# Patient Record
Sex: FEMALE | Race: BLACK OR AFRICAN AMERICAN | Employment: FULL TIME | URBAN - METROPOLITAN AREA
[De-identification: names, ages, dates, MRNs, and addresses within clinical notes are randomized per-mention and may not be internally consistent; named-entity substitution may affect disease eponyms.]

---

## 2018-04-06 ENCOUNTER — HOSPITAL ENCOUNTER (EMERGENCY)
Age: 38
Discharge: HOME | End: 2018-04-06
Attending: EMERGENCY MEDICINE
Payer: OTHER GOVERNMENT

## 2018-04-06 VITALS
DIASTOLIC BLOOD PRESSURE: 55 MMHG | HEIGHT: 63 IN | TEMPERATURE: 98.2 F | SYSTOLIC BLOOD PRESSURE: 126 MMHG | WEIGHT: 180 LBS | HEART RATE: 102 BPM | OXYGEN SATURATION: 99 % | RESPIRATION RATE: 23 BRPM | BODY MASS INDEX: 31.89 KG/M2

## 2018-04-06 DIAGNOSIS — L50.9 URTICARIA: Primary | ICD-10-CM

## 2018-04-06 LAB
ANION GAP BLD CALC-SCNC: 13 MMOL/L (ref 10–20)
BUN BLD-MCNC: 9 MG/DL (ref 9–20)
CA-I BLD-MCNC: 1.18 MMOL/L (ref 1.12–1.32)
CHLORIDE BLD-SCNC: 102 MMOL/L (ref 98–107)
CO2 BLD-SCNC: 29 MMOL/L (ref 21–32)
CREAT BLD-MCNC: 0.8 MG/DL (ref 0.6–1.3)
GLUCOSE BLD-MCNC: 84 MG/DL (ref 65–100)
HCT VFR BLD CALC: 47 % (ref 35–47)
POTASSIUM BLD-SCNC: 4.5 MMOL/L (ref 3.5–5.1)
SERVICE CMNT-IMP: NORMAL
SODIUM BLD-SCNC: 139 MMOL/L (ref 136–145)

## 2018-04-06 PROCEDURE — 96372 THER/PROPH/DIAG INJ SC/IM: CPT

## 2018-04-06 PROCEDURE — 96361 HYDRATE IV INFUSION ADD-ON: CPT

## 2018-04-06 PROCEDURE — 74011000250 HC RX REV CODE- 250: Performed by: EMERGENCY MEDICINE

## 2018-04-06 PROCEDURE — 74011250636 HC RX REV CODE- 250/636: Performed by: EMERGENCY MEDICINE

## 2018-04-06 PROCEDURE — 99284 EMERGENCY DEPT VISIT MOD MDM: CPT

## 2018-04-06 PROCEDURE — 80047 BASIC METABLC PNL IONIZED CA: CPT

## 2018-04-06 PROCEDURE — 96375 TX/PRO/DX INJ NEW DRUG ADDON: CPT

## 2018-04-06 PROCEDURE — 96374 THER/PROPH/DIAG INJ IV PUSH: CPT

## 2018-04-06 RX ORDER — SODIUM CHLORIDE 0.9 % (FLUSH) 0.9 %
5-10 SYRINGE (ML) INJECTION EVERY 8 HOURS
Status: DISCONTINUED | OUTPATIENT
Start: 2018-04-06 | End: 2018-04-06 | Stop reason: HOSPADM

## 2018-04-06 RX ORDER — ALBUTEROL SULFATE 5 MG/ML
SOLUTION RESPIRATORY (INHALATION) ONCE
COMMUNITY

## 2018-04-06 RX ORDER — PREDNISONE 20 MG/1
20 TABLET ORAL DAILY
Qty: 5 TAB | Refills: 0 | Status: SHIPPED | OUTPATIENT
Start: 2018-04-06 | End: 2018-04-11

## 2018-04-06 RX ORDER — EPINEPHRINE 1 MG/ML
0.3 INJECTION, SOLUTION, CONCENTRATE INTRAVENOUS
Status: COMPLETED | OUTPATIENT
Start: 2018-04-06 | End: 2018-04-06

## 2018-04-06 RX ORDER — EPINEPHRINE 0.3 MG/.3ML
0.3 INJECTION SUBCUTANEOUS
Qty: 1 SYRINGE | Refills: 0 | Status: SHIPPED | OUTPATIENT
Start: 2018-04-06

## 2018-04-06 RX ORDER — DIPHENHYDRAMINE HCL 25 MG
50 CAPSULE ORAL
Qty: 100 CAP | Refills: 0 | Status: SHIPPED | OUTPATIENT
Start: 2018-04-06 | End: 2018-04-16

## 2018-04-06 RX ORDER — FAMOTIDINE 10 MG/ML
20 INJECTION INTRAVENOUS
Status: COMPLETED | OUTPATIENT
Start: 2018-04-06 | End: 2018-04-06

## 2018-04-06 RX ORDER — EPINEPHRINE 1 MG/ML
0.3 INJECTION, SOLUTION, CONCENTRATE INTRAVENOUS
Status: DISCONTINUED | OUTPATIENT
Start: 2018-04-06 | End: 2018-04-06

## 2018-04-06 RX ORDER — BUDESONIDE AND FORMOTEROL FUMARATE DIHYDRATE 160; 4.5 UG/1; UG/1
2 AEROSOL RESPIRATORY (INHALATION) 2 TIMES DAILY
COMMUNITY

## 2018-04-06 RX ORDER — DIPHENHYDRAMINE HCL 25 MG
50 CAPSULE ORAL
COMMUNITY

## 2018-04-06 RX ORDER — SODIUM CHLORIDE 0.9 % (FLUSH) 0.9 %
5-10 SYRINGE (ML) INJECTION AS NEEDED
Status: DISCONTINUED | OUTPATIENT
Start: 2018-04-06 | End: 2018-04-06 | Stop reason: HOSPADM

## 2018-04-06 RX ADMIN — FAMOTIDINE 20 MG: 10 INJECTION INTRAVENOUS at 09:30

## 2018-04-06 RX ADMIN — SODIUM CHLORIDE 1000 ML: 900 INJECTION, SOLUTION INTRAVENOUS at 09:32

## 2018-04-06 RX ADMIN — EPINEPHRINE 0.3 MG: 1 INJECTION, SOLUTION, CONCENTRATE INTRAVENOUS at 09:30

## 2018-04-06 RX ADMIN — Medication 10 ML: at 09:54

## 2018-04-06 RX ADMIN — Medication 10 ML: at 09:33

## 2018-04-06 RX ADMIN — METHYLPREDNISOLONE SODIUM SUCCINATE 125 MG: 125 INJECTION, POWDER, FOR SOLUTION INTRAMUSCULAR; INTRAVENOUS at 09:29

## 2018-04-06 RX ADMIN — EPINEPHRINE 0.3 MG: 1 INJECTION, SOLUTION, CONCENTRATE INTRAVENOUS at 10:50

## 2018-04-06 NOTE — ED PROVIDER NOTES
EMERGENCY DEPARTMENT HISTORY AND PHYSICAL EXAM      Date: 4/6/2018  Patient Name: Edwardo Stanley    History of Presenting Illness     Chief Complaint   Patient presents with    Allergic Reaction     Patient traveling from [place] back to [facility]. They pulled over and came here. Patient started having hives and itching around 0730 this morning and it has progressed and now her eye lids are swelling. Last week patient had a similar reaction but had more hives. Patient c/o left eye blurred vision. Patient does not know what is causing the reaction. History Provided By: Patient    HPI: Edwardo Stanley, 40 y.o. female with PMHx significant for asthma, presents ambulatory to the ED with cc of pruritic hives to her extremities and torso since 7:30 AM today, along with associated swelling of her upper eyelids. Pt states she took Benadryl before coming to the ED no no relief of symptoms. She notes a history of similar symptoms, and she has been to an allergist previously, and she occasionally takes Zyrtec. Pt denies any difficulty swallowing. Her LMP was 2 weeks ago. PCP: None    There are no other complaints, changes, or physical findings at this time. Current Facility-Administered Medications   Medication Dose Route Frequency Provider Last Rate Last Dose    sodium chloride (NS) flush 5-10 mL  5-10 mL IntraVENous Q8H Lyle Ramirez MD   10 mL at 04/06/18 0933    sodium chloride (NS) flush 5-10 mL  5-10 mL IntraVENous PRN Lyle Ramirez MD   10 mL at 04/06/18 5141     Current Outpatient Prescriptions   Medication Sig Dispense Refill    diphenhydrAMINE (BENADRYL) 25 mg capsule Take 50 mg by mouth every six (6) hours as needed.  albuterol (PROVENTIL) 5 mg/mL nebulizer solution by Nebulization route once.  budesonide-formoterol (SYMBICORT) 160-4.5 mcg/actuation HFAA Take 2 Puffs by inhalation two (2) times a day.          Past History     Past Medical History:  Past Medical History:   Diagnosis Date    Asthma        Past Surgical History:  Past Surgical History:   Procedure Laterality Date    HX CHOLECYSTECTOMY      HX GYN      tubal ligation in 0       Family History:  History reviewed. No pertinent family history. Social History:  Social History   Substance Use Topics    Smoking status: Light Tobacco Smoker    Smokeless tobacco: Never Used    Alcohol use Yes       Allergies: Allergies   Allergen Reactions    Sulfa (Sulfonamide Antibiotics) Swelling     Throat swells         Review of Systems   Review of Systems   Constitutional: Negative for fever. HENT: Positive for facial swelling. Negative for sore throat and trouble swallowing. Eyes: Negative for photophobia and redness. Respiratory: Negative for shortness of breath and wheezing. Cardiovascular: Negative for chest pain and leg swelling. Gastrointestinal: Negative for abdominal pain, blood in stool, nausea and vomiting. Genitourinary: Negative for difficulty urinating, dysuria, hematuria, menstrual problem and vaginal bleeding. Musculoskeletal: Negative for back pain and joint swelling. Skin: Positive for rash. Neurological: Negative for dizziness, seizures, syncope, speech difficulty, weakness, numbness and headaches. Hematological: Negative for adenopathy. Psychiatric/Behavioral: Negative for agitation, confusion and suicidal ideas. The patient is not nervous/anxious. Physical Exam   Physical Exam   Constitutional: She is oriented to person, place, and time. She appears well-developed and well-nourished. No distress. HENT:   Head: Normocephalic and atraumatic. Mouth/Throat: Oropharynx is clear and moist. No oropharyngeal exudate. Eyes: Conjunctivae and EOM are normal. Pupils are equal, round, and reactive to light. Left eye exhibits no discharge. Neck: Normal range of motion. Neck supple. No JVD present. Cardiovascular: Normal rate, regular rhythm, normal heart sounds and intact distal pulses. Pulmonary/Chest: Effort normal and breath sounds normal. No respiratory distress. She has no wheezes. Abdominal: Soft. Bowel sounds are normal. She exhibits no distension. There is no tenderness. There is no rebound and no guarding. Musculoskeletal: Normal range of motion. She exhibits no edema or tenderness. Lymphadenopathy:     She has no cervical adenopathy. Neurological: She is alert and oriented to person, place, and time. She has normal reflexes. No cranial nerve deficit. Skin: Skin is warm and dry. Rash noted. Swelling of upper eyelids. Hives to upper extremities. Psychiatric: She has a normal mood and affect. Her behavior is normal.   Nursing note and vitals reviewed. Diagnostic Study Results     Labs -     Recent Results (from the past 12 hour(s))   POC CHEM8    Collection Time: 04/06/18 10:03 AM   Result Value Ref Range    Calcium, ionized (POC) 1.18 1.12 - 1.32 mmol/L    Sodium (POC) 139 136 - 145 mmol/L    Potassium (POC) 4.5 3.5 - 5.1 mmol/L    Chloride (POC) 102 98 - 107 mmol/L    CO2 (POC) 29 21 - 32 mmol/L    Anion gap (POC) 13 10 - 20 mmol/L    Glucose (POC) 84 65 - 100 mg/dL    BUN (POC) 9 9 - 20 mg/dL    Creatinine (POC) 0.8 0.6 - 1.3 mg/dL    GFRAA, POC >60 >60 ml/min/1.73m2    GFRNA, POC >60 >60 ml/min/1.73m2    Hematocrit (POC) 47 35.0 - 47.0 %    Comment Comment Not Indicated. Medical Decision Making   I am the first provider for this patient. I reviewed the vital signs, available nursing notes, past medical history, past surgical history, family history and social history. Vital Signs-Reviewed the patient's vital signs.   Patient Vitals for the past 12 hrs:   Temp Pulse Resp BP SpO2   04/06/18 1057 - 92 17 126/55 99 %   04/06/18 1050 - (!) 104 - - -   04/06/18 1045 - 90 16 - 97 %   04/06/18 1030 - 87 20 - 100 %   04/06/18 1015 - 87 22 - 99 %   04/06/18 1000 - 82 15 - 100 %   04/06/18 0945 - 96 13 - 100 %   04/06/18 0930 - - - 110/72 -   04/06/18 0915 - - - - 99 %   04/06/18 0906 98.2 °F (36.8 °C) 90 - 110/72 100 %       Pulse Oximetry Analysis - 100% on room air    Records Reviewed: Nursing Notes and Old Medical Records    Provider Notes (Medical Decision Making):   DDx: allergic reaction, urticaria     ED Course:   Initial assessment performed. The patients presenting problems have been discussed, and they are in agreement with the care plan formulated and outlined with them. I have encouraged them to ask questions as they arise throughout their visit. Progress Note  10:48 AM    Pt still complaining of mildly pruritic hives. Administering a second dose of epinephrine. Progress Note  11:08 AM    Cydney Joy MD has re-evaluated pt, and pt's hives have resolved. She reports feeling better. Disposition:  DISCHARGE NOTE  11:09 AM  The patient has been re-evaluated and is ready for discharge. Reviewed available results with patient. Counseled pt on diagnosis and care plan. Pt has expressed understanding, and all questions have been answered. Pt agrees with plan and agrees to follow up as recommended, or return to the ED if their symptoms worsen. Discharge instructions have been provided and explained to the pt, along with reasons to return to the ED. PLAN:  1. Current Discharge Medication List        2. Follow-up Information     Follow up With Details Comments Contact Info    None   None ([phone]) Patient stated that they have no PCP      [facility] EMERGENCY DEPT In 3 days  [address]        Return to ED if worse     Diagnosis     Clinical Impression:   1. Urticaria        Attestations: This note is prepared by Mary Escalante. Guerita Warner, acting as Scribe for Cydney Joy MD.    Cydney Joy MD: The scribe's documentation has been prepared under my direction and personally reviewed by me in its entirety.  I confirm that the note above accurately reflects all work, treatment, procedures, and medical decision making performed by me.

## 2018-04-06 NOTE — DISCHARGE INSTRUCTIONS
Hives: Care Instructions  Your Care Instructions  Hives are raised, red, itchy patches of skin. They are also called wheals or welts. They usually have red borders and pale centers. Hives range in size from ¼ inch to 3 inches or more across. They may seem to move from place to place on the skin. Several hives may form a large area of raised, red skin. You can get hives after an insect sting, after taking medicine or eating certain foods, or because of infection or stress. Other causes include plants, things you breathe in, makeup, heat, cold, sunlight, and latex. You cannot spread hives to other people. Hives may last a few minutes or a few days, but a single spot may last less than 36 hours. Follow-up care is a key part of your treatment and safety. Be sure to make and go to all appointments, and call your doctor if you are having problems. It's also a good idea to know your test results and keep a list of the medicines you take. How can you care for yourself at home? · Avoid whatever you think may have caused your hives, such as a certain food or medicine. However, you may not know the cause. · Put a cool, wet towel on the area to relieve itching. · Take an over-the-counter antihistamine, such as diphenhydramine (Benadryl), cetirizine (Zyrtec), or loratadine (Claritin), to help stop the hives and calm the itching. Read and follow directions on the label. These medicines can make you feel sleepy. Do not drive while using them. · Stay away from strong soaps, detergents, and chemicals. These can make itching worse. When should you call for help? Call 911 anytime you think you may need emergency care. For example, call if:  ? · You have symptoms of a severe allergic reaction. These may include:  ¨ Sudden raised, red areas (hives) all over your body. ¨ Swelling of the throat, mouth, lips, or tongue. ¨ Trouble breathing. ¨ Passing out (losing consciousness).  Or you may feel very lightheaded or suddenly feel weak, confused, or restless. ?Call your doctor now or seek immediate medical care if:  ? · You have symptoms of an allergic reaction, such as:  ¨ A rash or hives (raised, red areas on the skin). ¨ Itching. ¨ Swelling. ¨ Belly pain, nausea, or vomiting. ? · You get hives after you start a new medicine. ? · Hives have not gone away after 24 hours. ? Watch closely for changes in your health, and be sure to contact your doctor if:  ? · You do not get better as expected. Where can you learn more? Go to http://elva-luis.info/. Enter A429 in the search box to learn more about \"Hives: Care Instructions. \"  Current as of: March 20, 2017  Content Version: 11.4  © 8629-5454 [employer]. Care instructions adapted under license by [employer] (which disclaims liability or warranty for this information). If you have questions about a medical condition or this instruction, always ask your healthcare professional. [place] [address] any warranty or liability for your use of this information.

## 2018-04-06 NOTE — LETTER
[facility] EMERGENCY DEPT 
[address] [id number] [phone] Work/School Note Date: 4/6/2018 To Whom It May concern: 
 
Negin Garcia was seen and treated today in the emergency room by the following provider(s): 
No providers found. Negin Garcia may return to work on 04/09/2018. Sincerely, Treasure Leyden, RN

## 2018-04-06 NOTE — ED NOTES

## 2018-04-06 NOTE — ED TRIAGE NOTES
Pt presents with raised red rash over body with severe itching, largest on left flank and swelling of eyelids. Reaction started at 7:48 am today. Pt has history of environmental allergies.